# Patient Record
(demographics unavailable — no encounter records)

---

## 2025-01-29 NOTE — DISCUSSION/SUMMARY
[FreeTextEntry1] : 4 yo female with ASD, ADHD, anxiety with nail biting.  Try to discourage nail biting- may use Don't a topical polish to discourage biting.  Warm soaks BID with warm water and Betadine, use antibacterial soap for hands.  Mupirocin oint TID x 7 d.  If not improving may give Augmentin 600 mg BID x 7 d.

## 2025-01-29 NOTE — HISTORY OF PRESENT ILLNESS
[de-identified] : 3 infected fingers [FreeTextEntry6] : Pt has bitten her fingers and nails in the past but recently is has been worse, several of her nail beds are inflamed, red and infected.  School causes her anxiety and she also has ADHD- she was tried on Methylphenidate which mother said made her more anxious, she was not eating much and was not sleeping well so mother stopped it.  While she did take the medication she was biting her fingers and skin more frequently.  No fevers.  No drainage.

## 2025-01-29 NOTE — PHYSICAL EXAM
[NL] : moves all extremities x4, warm, well perfused x4 [de-identified] : several nail beds with erythema and swelling.  Left hand middle finger paronychia.

## 2025-04-09 NOTE — HISTORY OF PRESENT ILLNESS
[de-identified] : vomiting and diarrhea. [FreeTextEntry6] : 3 d ago on Sunday vomiting 3x, diarrhea liquid started Monday, yesterday diarrhea 5x/d.  Drinking well, tangerine, apple juice.  Drinking water.  Ate turkey sandwich this AM, not eating as much solid food.  No vomiting since Sunday.  SA on and off.  No fever, no runny nose, no ST, no cough.  No rash.

## 2025-04-09 NOTE — DISCUSSION/SUMMARY
[FreeTextEntry1] : 6 yo female with vomiting and diarrhea, abdominal pain, Rapid COVID19 light +, Rapid Flu A and B neg.  Flu panel sent.  Increase fluids-Pedialyte, monitor UO, bland and binding diet, no dairy, no greasy, fried or fatty foods.  Probiotic.  Immodium AD PRN.  Call if diarrhea more than 10 days or blood in stool.

## 2025-04-09 NOTE — HISTORY OF PRESENT ILLNESS
[de-identified] : vomiting and diarrhea. [FreeTextEntry6] : 3 d ago on Sunday vomiting 3x, diarrhea liquid started Monday, yesterday diarrhea 5x/d.  Drinking well, tangerine, apple juice.  Drinking water.  Ate turkey sandwich this AM, not eating as much solid food.  No vomiting since Sunday.  SA on and off.  No fever, no runny nose, no ST, no cough.  No rash.

## 2025-05-14 NOTE — SOCIAL HISTORY
[Mother] : mother [de-identified] : Father has disappeared from [facility]. Moved to [place], blocked mom from contacting

## 2025-05-14 NOTE — REASON FOR VISIT
[Follow-Up Visit] : a follow-up visit for [ADHD] : ADHD [Autism Spectrum Disorder] : autism spectrum disorder [Patient] : patient [Mother] : mother [Report cards] : report cards [IEP] : IEP [Medical records] : medical records

## 2025-05-14 NOTE — PLAN
[Med Options Discussed: _____] : - Medication options discussed [unfilled] [Continue IEP] : - Continue services as presently provided for in the Individualized Education Program [ADHD EDU/Behav. Strategies (Gen)] : - Those educational and behavioral strategies known to be helpful to children with ADHD should be implemented in the classroom. [Instruction in Executive Function Skills] : - Direct, individualized instruction in executive function-related skills: i.e. task analysis, planning, organization, study strategies, memorization [Monitor Attention] : - [unfilled]'s attention skills will need to continue to be monitored [Home Behavior Techniques] : - Specific behavioral techniques that can be implemented at home were discussed [[email or web address]] : - [email or web address] - Children and Adults with Attention Deficit Hyperactivity Disorder [Follow-up visit (med treatment monitoring): ____] : - Follow-up visit in [unfilled]  to evaluate response to medication and monitoring of medication treatment [Follow-up call: ____] : - Follow-up telephone call: [unfilled]  [Teacher BRS] : - Newly completed teacher behavior rating scale(s) [Parent BRS] : - Newly completed parent behavior rating scale [IEP or IFSP] : - Copy of most recent Individualized Education Program (IEP) or Family Service Plan (IFSP) [Test reports] : - Reports of most recent psychological, educational, speech/language, PT, OT test results [Accuracy] : Accuracy and reliability of clinical impressions [Findings (To Date)] : Findings from evaluation (to date) [Clinical Basis] : Clinical basis for current diagnosis and clinical impressions [Differential Diagnosis] : Differential diagnosis [Co-Morbidities] : Clinical disorders and problem commonly associated with this child's condition (now or in the future) [Prognosis] : Prognosis [Goals / Benefits] : Goals & potential benefits of treatment with medication, as well as the limitations of pharmacotherapy [Resources] : Other available resources [CSE / IEP] : Committee on Special Education (CSE) evaluations and Individualized Education Programs (IEP) [Family Questions] : Family's questions were addressed [Diet] : Evidence-based clinical information about diet [Sleep] : The importance of sleep and strategies to ensure adequate sleep

## 2025-05-14 NOTE — HISTORY OF PRESENT ILLNESS
[SC: _____] : self-contained [unfilled] [IEP] : Individualized Education Program [OHI] : Other Health Impairment [OT: ____] : Occupational Therapy [unfilled] [S-L: _____] : Speech/Language Therapy [unfilled] [Counseling: _____] : Counseling [unfilled] [12 mos.] : 12 - Month Special Service and/or Program: No [FreeTextEntry6] : PT discontinued [FreeTextEntry5] : CSE was talking about declassifying her for ! Mother hired an  and she was approved for IEP Denied ESY; will go to [employer] Next year's IEP meeting will be June 5th- School wanted her to push into CTT for 80 min/day.   [TWNoteComboBox1] :  [Doing Well] : Doing well [de-identified] : Easily distracted [de-identified] : Very active, "too much" [de-identified] : Sleeps great [Major Illness] : no major illness [Major Injury] : no major injury [Surgery] : no surgery [Hospitalizations] : no hospitalizations [New Medications] : no new medication [New Allergies] : no new allergies

## 2025-06-12 NOTE — DISCUSSION/SUMMARY
[Normal Growth] : growth [None] : No known medical problems [Normal Development] : development [No Elimination Concerns] : elimination [No Feeding Concerns] : feeding [No Skin Concerns] : skin [Normal Sleep Pattern] : sleep [School Readiness] : school readiness [Mental Health] : mental health [Nutrition and Physical Activity] : nutrition and physical activity [Safety] : safety [Oral Health] : oral health [No Medications] : ~He/She~ is not on any medications [Patient] : patient [Full Activity without restrictions including Physical Education & Athletics] : Full Activity without restrictions including Physical Education & Athletics [de-identified] : 5-2-1-0 increase fruits and veg, watch extra calories.  Will check A1c and TFTs [FreeTextEntry1] : 5 yo overweight well female with ADHD and ASD here for annual well visit.  Immunizations UTD.  05/14/25 DB F/U- Advised to start trial of medication- (mother did not) Methylpheidate HCl ER 10 mg, IEP meeting for next year on 06/05/25.  Out of SE regular class in Fall felt she is transitioning to a regular class with an aid, Push in 2nd grade for English Level M reading.  [employer] 1st grade in Fall K Self contained SE 12:1:2 PT D/C ed in fall OT 1x/d continuing for grasp ST 2x/d-D/Doe in fall counselling will be continued  Go Check Kids-passed. Lead screen reviewed.  Anticipatory guidance given.  Continue balanced diet with all food groups. Brush teeth twice a day with toothbrush. Recommend visit to dentist. As per car seat 's guidelines, use foward-facing booster seat until child reaches highest weight/height for seat. Put child to sleep in own bed. Help child to maintain consistent daily routines and sleep schedule.  discussed. Ensure home is safe. Teach child about personal safety. Use consistent, positive discipline. Read aloud to child. Limit screen time to no more than 2 hours per day. Discussion regarding the influence that bring overweight has on future medical problems- dyslipidemia/HTN/Diabetes, as well as psychological effects, such as depression, self-esteem issues and social isolation.  Goal should be targeted to <85% BMI for age and sex.  A balanced weight reduction diet focused on healthy life style practices was recommended.  Behavior modifications such as regarding proper eating habits- increase proteins and decrease carbohydrates, keeping a food diary, eating more slowly.  Try not to eat on the go or in front of a TV, choosing healthy snacks and making healthier choices- portion control, do not eat family style, try to avoid second helpings and having and activity when feeling the need to eat out of boredom or depression/anxiety and increasing physical activity on a daily basis. Return 1 year for routine well child check.

## 2025-06-12 NOTE — PHYSICAL EXAM
[Alert] : alert [No Acute Distress] : no acute distress [Normocephalic] : normocephalic [Conjunctivae with no discharge] : conjunctivae with no discharge [PERRL] : PERRL [EOMI Bilateral] : EOMI bilateral [Auricles Well Formed] : auricles well formed [Clear Tympanic membranes with present light reflex and bony landmarks] : clear tympanic membranes with present light reflex and bony landmarks [No Discharge] : no discharge [Nares Patent] : nares patent [Pink Nasal Mucosa] : pink nasal mucosa [Palate Intact] : palate intact [Nonerythematous Oropharynx] : nonerythematous oropharynx [Supple, full passive range of motion] : supple, full passive range of motion [Symmetric Chest Rise] : symmetric chest rise [No Palpable Masses] : no palpable masses [Clear to Auscultation Bilaterally] : clear to auscultation bilaterally [Regular Rate and Rhythm] : regular rate and rhythm [Normal S1, S2 present] : normal S1, S2 present [No Murmurs] : no murmurs [+2 Femoral Pulses] : +2 femoral pulses [Soft] : soft [NonTender] : non tender [Non Distended] : non distended [Normoactive Bowel Sounds] : normoactive bowel sounds [No Hepatomegaly] : no hepatomegaly [No Splenomegaly] : no splenomegaly [Patent] : patent [No fissures] : no fissures [No Abnormal Lymph Nodes Palpated] : no abnormal lymph nodes palpated [No Gait Asymmetry] : no gait asymmetry [No pain or deformities with palpation of bone, muscles, joints] : no pain or deformities with palpation of bone, muscles, joints [Normal Muscle Tone] : normal muscle tone [Straight] : straight [+2 Patella DTR] : +2 patella DTR [No Rash or Lesions] : no rash or lesions [Cranial Nerves Grossly Intact] : cranial nerves grossly intact [Denys: ____] : Denys [unfilled] [Denys: _____] : Denys [unfilled]

## 2025-06-12 NOTE — DISCUSSION/SUMMARY
[Normal Growth] : growth [None] : No known medical problems [Normal Development] : development [No Elimination Concerns] : elimination [No Feeding Concerns] : feeding [No Skin Concerns] : skin [Normal Sleep Pattern] : sleep [School Readiness] : school readiness [Mental Health] : mental health [Nutrition and Physical Activity] : nutrition and physical activity [Safety] : safety [Oral Health] : oral health [No Medications] : ~He/She~ is not on any medications [Patient] : patient [Full Activity without restrictions including Physical Education & Athletics] : Full Activity without restrictions including Physical Education & Athletics [de-identified] : 5-2-1-0 increase fruits and veg, watch extra calories.  Will check A1c and TFTs [FreeTextEntry1] : 5 yo overweight well female with ADHD and ASD here for annual well visit.  Immunizations UTD.  05/14/25 DB F/U- Advised to start trial of medication- (mother did not) Methylpheidate HCl ER 10 mg, IEP meeting for next year on 06/05/25.  Out of SE regular class in Fall felt she is transitioning to a regular class with an aid, Push in 2nd grade for English Level M reading.  [employer] 1st grade in Fall K Self contained SE 12:1:2 PT D/C ed in fall OT 1x/d continuing for grasp ST 2x/d-D/Doe in fall counselling will be continued  Go Check Kids-passed. Lead screen reviewed.  Anticipatory guidance given.  Continue balanced diet with all food groups. Brush teeth twice a day with toothbrush. Recommend visit to dentist. As per car seat 's guidelines, use foward-facing booster seat until child reaches highest weight/height for seat. Put child to sleep in own bed. Help child to maintain consistent daily routines and sleep schedule.  discussed. Ensure home is safe. Teach child about personal safety. Use consistent, positive discipline. Read aloud to child. Limit screen time to no more than 2 hours per day. Discussion regarding the influence that bring overweight has on future medical problems- dyslipidemia/HTN/Diabetes, as well as psychological effects, such as depression, self-esteem issues and social isolation.  Goal should be targeted to <85% BMI for age and sex.  A balanced weight reduction diet focused on healthy life style practices was recommended.  Behavior modifications such as regarding proper eating habits- increase proteins and decrease carbohydrates, keeping a food diary, eating more slowly.  Try not to eat on the go or in front of a TV, choosing healthy snacks and making healthier choices- portion control, do not eat family style, try to avoid second helpings and having and activity when feeling the need to eat out of boredom or depression/anxiety and increasing physical activity on a daily basis. Return 1 year for routine well child check.

## 2025-06-12 NOTE — HISTORY OF PRESENT ILLNESS
[Mother] : mother [Fruit] : fruit [Vegetables] : vegetables [Meat] : meat [Grains] : grains [Eggs] : eggs [Fish] : fish [Dairy] : dairy [___ stools per day] : [unfilled]  stools per day [Normal] : Normal [In own bed] : In own bed [Brushing teeth] : Brushing teeth [Yes] : Patient goes to dentist yearly [Vitamin] : Primary Fluoride Source: Vitamin [Playtime (60 min/d)] : Playtime 60 min a day [< 2 hrs of screen time] : Less than 2 hrs of screen time [Appropiate parent-child-sibling interaction] : Appropriate parent-child-sibling interaction [Child Cooperates] : Child cooperates [Parent has appropriate responses to behavior] : Parent has appropriate responses to behavior [Grade ___] : Grade [unfilled] [No] : Not at  exposure [Water heater temperature set at <120 degrees F] : Water heater temperature set at <120 degrees F [Car seat in back seat] : Car seat in back seat [Carbon Monoxide Detectors] : Carbon monoxide detectors [Smoke Detectors] : Smoke detectors [Supervised outdoor play] : Supervised outdoor play [Up to date] : Up to date [NO] : No [Special Education] : receives special education  [Exposure to electronic nicotine delivery system] : No exposure to electronic nicotine delivery system [de-identified] : Picky- sehlleyzza, lettuce, broccoli.  Rigid [FreeTextEntry3] : 10 h [FreeTextEntry9] : [employer], swim, hikes, beach  06/17/25 extracting baby teeth [de-identified] : SE Self contained 12:1:2  ADHD, ASD  [employer].  Will be in regular 1st grade class in fall with an Aide OT for pen grasp and counselling to continue, all other services to be D/C ed.  Push in 2nd grade for English Level M reading. [FreeTextEntry1] : 5 yo overweight well female with ADHD and ASD here for annual well visit.  Immunizations UTD.  05/14/25 DB F/U- Advised to start trial of medication- (mother did not) Methylpheidate HCl ER 10 mg, IEP meeting for next year on 06/05/25.  Out of SE regular class in Fall felt she is transitioning to a regular class with an aid, Push in 2nd grade for English Level M reading.  [employer] 1st grade in Fall K Self contained SE 12:1:2 PT D/C ed in fall OT 1x/d continuing for grasp ST 2x/d-D/Doe in fall counselling will be continued

## 2025-06-12 NOTE — PHYSICAL EXAM
[Alert] : alert [No Acute Distress] : no acute distress [Normocephalic] : normocephalic [Conjunctivae with no discharge] : conjunctivae with no discharge [PERRL] : PERRL [EOMI Bilateral] : EOMI bilateral [Auricles Well Formed] : auricles well formed [Clear Tympanic membranes with present light reflex and bony landmarks] : clear tympanic membranes with present light reflex and bony landmarks [Nares Patent] : nares patent [No Discharge] : no discharge [Pink Nasal Mucosa] : pink nasal mucosa [Palate Intact] : palate intact [Nonerythematous Oropharynx] : nonerythematous oropharynx [Supple, full passive range of motion] : supple, full passive range of motion [No Palpable Masses] : no palpable masses [Symmetric Chest Rise] : symmetric chest rise [Clear to Auscultation Bilaterally] : clear to auscultation bilaterally [Regular Rate and Rhythm] : regular rate and rhythm [Normal S1, S2 present] : normal S1, S2 present [No Murmurs] : no murmurs [+2 Femoral Pulses] : +2 femoral pulses [NonTender] : non tender [Soft] : soft [Non Distended] : non distended [Normoactive Bowel Sounds] : normoactive bowel sounds [No Hepatomegaly] : no hepatomegaly [No Splenomegaly] : no splenomegaly [Patent] : patent [No fissures] : no fissures [No Abnormal Lymph Nodes Palpated] : no abnormal lymph nodes palpated [No Gait Asymmetry] : no gait asymmetry [No pain or deformities with palpation of bone, muscles, joints] : no pain or deformities with palpation of bone, muscles, joints [Normal Muscle Tone] : normal muscle tone [Straight] : straight [+2 Patella DTR] : +2 patella DTR [No Rash or Lesions] : no rash or lesions [Cranial Nerves Grossly Intact] : cranial nerves grossly intact [Denys: ____] : Denys [unfilled] [Denys: _____] : Denys [unfilled]

## 2025-06-12 NOTE — DEVELOPMENTAL MILESTONES
[Normal Development] : Normal Development [Cuts most foods with a knife] : cuts most foods with a knife [Is dry day and night] : is dry day and night [Chooses preferred foods] : chooses preferred foods [Starts/continues conversation with peers] : starts/continues conversation with peers [Plays and interacts with at least one] : plays and interacts with at least one "best friend" [Tells a story with a beginning,] : tells a story with a beginning, a middle, and an end [Masters all consonant sounds and] : masters all consonant sounds and combinations, such as "d" or "ch" [Counts 10 objects] : counts 10 objects [Can do simple addition and] : can do simple addition and subtraction with objects [Rides a standard bike] : rides a standard bike [Hops on one foot 3 to 4 times] : hops on one foot 3 to 4 times [Draw a 12-part person] : draw a 12-part person [Prints 3 or more simple words] : prints 3 or more simple words without copying [Writes first and last name in] : writes first and last name in uppercase or lowercase letters [None] : none [Ties shoes] : does not tie shoes

## 2025-06-12 NOTE — HISTORY OF PRESENT ILLNESS
[Mother] : mother [Fruit] : fruit [Vegetables] : vegetables [Meat] : meat [Grains] : grains [Eggs] : eggs [Fish] : fish [Dairy] : dairy [___ stools per day] : [unfilled]  stools per day [Normal] : Normal [In own bed] : In own bed [Brushing teeth] : Brushing teeth [Yes] : Patient goes to dentist yearly [Vitamin] : Primary Fluoride Source: Vitamin [Playtime (60 min/d)] : Playtime 60 min a day [< 2 hrs of screen time] : Less than 2 hrs of screen time [Appropiate parent-child-sibling interaction] : Appropriate parent-child-sibling interaction [Child Cooperates] : Child cooperates [Parent has appropriate responses to behavior] : Parent has appropriate responses to behavior [Grade ___] : Grade [unfilled] [No] : Not at  exposure [Water heater temperature set at <120 degrees F] : Water heater temperature set at <120 degrees F [Car seat in back seat] : Car seat in back seat [Carbon Monoxide Detectors] : Carbon monoxide detectors [Smoke Detectors] : Smoke detectors [Supervised outdoor play] : Supervised outdoor play [Up to date] : Up to date [NO] : No [Special Education] : receives special education  [Exposure to electronic nicotine delivery system] : No exposure to electronic nicotine delivery system [de-identified] : Picky- shelleyzza, lettuce, broccoli.  Rigid [FreeTextEntry3] : 10 h [FreeTextEntry9] : [employer], swim, hikes, beach  06/17/25 extracting baby teeth [de-identified] : SE Self contained 12:1:2  ADHD, ASD  [employer].  Will be in regular 1st grade class in fall with an Aide OT for pen grasp and counselling to continue, all other services to be D/C ed.  Push in 2nd grade for English Level M reading. [FreeTextEntry1] : 5 yo overweight well female with ADHD and ASD here for annual well visit.  Immunizations UTD.  05/14/25 DB F/U- Advised to start trial of medication- (mother did not) Methylpheidate HCl ER 10 mg, IEP meeting for next year on 06/05/25.  Out of SE regular class in Fall felt she is transitioning to a regular class with an aid, Push in 2nd grade for English Level M reading.  [employer] 1st grade in Fall K Self contained SE 12:1:2 PT D/C ed in fall OT 1x/d continuing for grasp ST 2x/d-D/Doe in fall counselling will be continued

## 2025-07-25 NOTE — PHYSICAL EXAM
[Acute Distress] : no acute distress [Alert] : alert [EOMI] : grossly EOMI [Pink Nasal Mucosa] : pink nasal mucosa [Erythematous Oropharynx] : nonerythematous oropharynx [Supple] : supple [Clear to Auscultation Bilaterally] : clear to auscultation bilaterally [Regular Rate and Rhythm] : regular rate and rhythm [Soft] : soft [Moves All Extremities x 4] : moves all extremities x4 [Normotonic] : normotonic [de-identified] : Bug bites

## 2025-07-25 NOTE — PHYSICAL EXAM
[Acute Distress] : no acute distress [Alert] : alert [EOMI] : grossly EOMI [Pink Nasal Mucosa] : pink nasal mucosa [Erythematous Oropharynx] : nonerythematous oropharynx [Supple] : supple [Clear to Auscultation Bilaterally] : clear to auscultation bilaterally [Regular Rate and Rhythm] : regular rate and rhythm [Soft] : soft [Moves All Extremities x 4] : moves all extremities x4 [Normotonic] : normotonic [de-identified] : Bug bites

## 2025-07-25 NOTE — HISTORY OF PRESENT ILLNESS
[de-identified] : ASD/Rash [FreeTextEntry6] : Hannah has been going to camp at the [employer] and has been going on different trips about 4 days out of the week. She uses sunscreen all the time. Mother has noticed spots that she seems to itch a lot, and she is not sure what they are. She is eating and sleeping well. No cold symptoms or coughing. No ear pain or pressure. No sore throat. No SA/V/D/C/loose stools. Ques addressed.

## 2025-07-25 NOTE — DISCUSSION/SUMMARY
[FreeTextEntry1] : 7 y/o F with ASD/Bug bites/Pruritus- Reassured mother that these are bug bites- She may give baking soda baths to help alleviate the itching and cool compresses if needed. Natural bug sprays with citronella, etc may help a bit, but DEET is better. Mometasone cream sparingly as needed 1-2x/day. We did have discussion regarding Leucovorin and how it is being studied and showing advances with symptoms of Autism- study given to mother to review and she may call with any questions or if she desires to try it. I did explain that she will need to be closely monitored, elida in the beginning. Ques addressed. Mother verbalizes understanding. Check back if any questions/concerns. Time spent patient/chart- 32 mins.

## 2025-07-25 NOTE — HISTORY OF PRESENT ILLNESS
[de-identified] : ASD/Rash [FreeTextEntry6] : Hannah has been going to camp at the [employer] [place] and has been going on different trips about 4 days out of the week. She uses sunscreen all the time. Mother has noticed spots that she seems to itch a lot, and she is not sure what they are. She is eating and sleeping well. No cold symptoms or coughing. No ear pain or pressure. No sore throat. No SA/V/D/C/loose stools. Ques addressed.

## 2025-07-25 NOTE — HISTORY OF PRESENT ILLNESS
[de-identified] : ASD/Rash [FreeTextEntry6] : Hannah has been going to camp at the [employer] [place] and has been going on different trips about 4 days out of the week. She uses sunscreen all the time. Mother has noticed spots that she seems to itch a lot, and she is not sure what they are. She is eating and sleeping well. No cold symptoms or coughing. No ear pain or pressure. No sore throat. No SA/V/D/C/loose stools. Ques addressed.

## 2025-07-25 NOTE — PHYSICAL EXAM
[Acute Distress] : no acute distress [Alert] : alert [EOMI] : grossly EOMI [Pink Nasal Mucosa] : pink nasal mucosa [Erythematous Oropharynx] : nonerythematous oropharynx [Supple] : supple [Clear to Auscultation Bilaterally] : clear to auscultation bilaterally [Regular Rate and Rhythm] : regular rate and rhythm [Soft] : soft [Moves All Extremities x 4] : moves all extremities x4 [Normotonic] : normotonic [de-identified] : Bug bites